# Patient Record
Sex: MALE | URBAN - METROPOLITAN AREA
[De-identification: names, ages, dates, MRNs, and addresses within clinical notes are randomized per-mention and may not be internally consistent; named-entity substitution may affect disease eponyms.]

---

## 2022-11-07 ENCOUNTER — OFFICE VISIT (OUTPATIENT)
Dept: URBAN - METROPOLITAN AREA CLINIC 83 | Facility: CLINIC | Age: 57
End: 2022-11-07
Payer: COMMERCIAL

## 2022-11-07 DIAGNOSIS — H33.311 HORSESHOE TEAR OF RETINA WITHOUT DETACHMENT, RIGHT EYE: ICD-10-CM

## 2022-11-07 DIAGNOSIS — H33.012 RETINAL DETACHMENT WITH SINGLE BREAK, LEFT EYE: Primary | ICD-10-CM

## 2022-11-07 DIAGNOSIS — H43.813 VITREOUS DEGENERATION, BILATERAL: ICD-10-CM

## 2022-11-07 PROCEDURE — 92134 CPTRZ OPH DX IMG PST SGM RTA: CPT | Performed by: OPHTHALMOLOGY

## 2022-11-07 PROCEDURE — 99205 OFFICE O/P NEW HI 60 MIN: CPT | Performed by: OPHTHALMOLOGY

## 2022-11-07 RX ORDER — OFLOXACIN 3 MG/ML
0.3 % SOLUTION/ DROPS OPHTHALMIC
Qty: 5 | Refills: 0 | Status: INACTIVE
Start: 2022-11-07 | End: 2022-12-06

## 2022-11-07 RX ORDER — PREDNISOLONE ACETATE 10 MG/ML
1 % SUSPENSION/ DROPS OPHTHALMIC
Qty: 5 | Refills: 3 | Status: INACTIVE
Start: 2022-11-07 | End: 2023-02-04

## 2022-11-07 ASSESSMENT — INTRAOCULAR PRESSURE
OS: 16
OD: 20

## 2022-11-07 NOTE — IMPRESSION/PLAN
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 OCT:   Plan: --exam confirms a macula-sparing RD OS
--findings/diagnosis d/w pt in detail --urgent surgery advised to prevent further vision loss --r/b/a of SB, PPV, and pneumatic retinopexy discussed
--risks discussed, inc bleeding, pain, infection, loss of vision --pt understands that pre-RD VA may not fully return --pt elects to proceed with RD repair SURGICAL PLAN: 25G PPV/EL/GAS OS (within 24 hours)

## 2022-11-07 NOTE — IMPRESSION/PLAN
Impression: Horseshoe tear of retina without detachment, right eye: H33.311.
s/p laser retinopexy - 2018 Plan: --history or retinal tear OD
--retinal remains fully attached, good laser barricade

## 2022-11-09 ENCOUNTER — POST-OPERATIVE VISIT (OUTPATIENT)
Dept: URBAN - METROPOLITAN AREA CLINIC 7 | Facility: CLINIC | Age: 57
End: 2022-11-09
Payer: COMMERCIAL

## 2022-11-09 PROCEDURE — 99024 POSTOP FOLLOW-UP VISIT: CPT | Performed by: OPHTHALMOLOGY

## 2022-11-09 ASSESSMENT — INTRAOCULAR PRESSURE
OS: 14
OD: 17

## 2022-11-09 NOTE — IMPRESSION/PLAN
Impression: S/P  25G PPV/EL/GAS OS  - 1 Day. Retinal detachment with single break, left eye  H33.012. Plan: PF/Oflox QID OS Gas precautions 
sleep on right side RTC 1 week POS DFE OS LH

## 2022-11-14 ENCOUNTER — POST-OPERATIVE VISIT (OUTPATIENT)
Dept: URBAN - METROPOLITAN AREA CLINIC 83 | Facility: CLINIC | Age: 57
End: 2022-11-14
Payer: COMMERCIAL

## 2022-11-14 DIAGNOSIS — H33.012 RETINAL DETACHMENT WITH SINGLE BREAK, LEFT EYE: Primary | ICD-10-CM

## 2022-11-14 PROCEDURE — 99024 POSTOP FOLLOW-UP VISIT: CPT | Performed by: OPHTHALMOLOGY

## 2022-11-14 ASSESSMENT — INTRAOCULAR PRESSURE
OS: 11
OD: 14

## 2022-11-14 NOTE — IMPRESSION/PLAN
Impression: S/P  25G PPV/EL/GAS OS  - 6 Days. Retinal detachment with single break, left eye  H33.012.  Plan: --retina attached
--no s/s infection
--IOP acceptable
--d/c Oflox, taper PF as directed
--RD/endoph WS discussed
--alt restrictions/positioning discussed
--f/u  3-4 weeks PO/OCT

## 2022-11-21 ENCOUNTER — POST-OPERATIVE VISIT (OUTPATIENT)
Dept: URBAN - METROPOLITAN AREA CLINIC 83 | Facility: CLINIC | Age: 57
End: 2022-11-21
Payer: COMMERCIAL

## 2022-11-21 DIAGNOSIS — H33.012 RETINAL DETACHMENT WITH SINGLE BREAK, LEFT EYE: Primary | ICD-10-CM

## 2022-11-21 PROCEDURE — 99024 POSTOP FOLLOW-UP VISIT: CPT | Performed by: OPHTHALMOLOGY

## 2022-11-21 RX ORDER — OFLOXACIN 3 MG/ML
0.3 % SOLUTION/ DROPS OPHTHALMIC
Qty: 5 | Refills: 0 | Status: INACTIVE
Start: 2022-11-21 | End: 2022-12-20

## 2022-11-21 RX ORDER — PREDNISOLONE ACETATE 10 MG/ML
1 % SUSPENSION/ DROPS OPHTHALMIC
Qty: 5 | Refills: 3 | Status: INACTIVE
Start: 2022-11-21 | End: 2023-02-18

## 2022-11-21 ASSESSMENT — INTRAOCULAR PRESSURE: OS: 18

## 2022-11-21 NOTE — IMPRESSION/PLAN
Impression: S/P  25G PPV/EL/GAS OS - 13 Days. Retinal detachment with single break, left eye  H33.012. Plan: --unfortunately there is a recurrent RD OS
--additional surgery advised to prevent further vision loss --r/b/a of PPV, SB, MP, EL, and GAS vs SO discussed
--risks discussed, inc bleeding, pain, infection, loss of vision --pt understands that pre-RD vision unlikely to fully return --pt elects to proceed with RD repair SURGICAL PLAN: 25G PPV/MP/EL/GAS OS

## 2022-11-23 ENCOUNTER — POST-OPERATIVE VISIT (OUTPATIENT)
Dept: URBAN - METROPOLITAN AREA CLINIC 7 | Facility: CLINIC | Age: 57
End: 2022-11-23
Payer: COMMERCIAL

## 2022-11-23 PROCEDURE — 99024 POSTOP FOLLOW-UP VISIT: CPT | Performed by: OPHTHALMOLOGY

## 2022-11-23 ASSESSMENT — INTRAOCULAR PRESSURE
OD: 9
OS: 7

## 2022-11-23 NOTE — IMPRESSION/PLAN
Impression: S/P  25G PPV/EL/GAS OS - 1 Day. Retinal detachment with single break, left eye  H33.012. Plan: Doing well, start Rx. Alt precautions. 

1 week SM

## 2022-11-28 ENCOUNTER — POST-OPERATIVE VISIT (OUTPATIENT)
Dept: URBAN - METROPOLITAN AREA CLINIC 83 | Facility: CLINIC | Age: 57
End: 2022-11-28
Payer: COMMERCIAL

## 2022-11-28 DIAGNOSIS — H33.012 RETINAL DETACHMENT WITH SINGLE BREAK, LEFT EYE: Primary | ICD-10-CM

## 2022-11-28 PROCEDURE — 99024 POSTOP FOLLOW-UP VISIT: CPT | Performed by: OPHTHALMOLOGY

## 2022-11-28 ASSESSMENT — INTRAOCULAR PRESSURE
OS: 13
OD: 12

## 2022-11-28 NOTE — IMPRESSION/PLAN
Impression: S/P S/P  25G PPV/EL/GAS OS - 6 Days. Retinal detachment with single break, left eye  H33.012.  Plan: --retina attached
--no s/s infection
--IOP acceptable
--d/c Oflox, taper PF as directed
--RD/endoph WS discussed
--alt restrictions/positioning discussed
--f/u 2 weeks for PO OS

## 2022-12-12 ENCOUNTER — POST-OPERATIVE VISIT (OUTPATIENT)
Dept: URBAN - METROPOLITAN AREA CLINIC 83 | Facility: CLINIC | Age: 57
End: 2022-12-12
Payer: COMMERCIAL

## 2022-12-12 DIAGNOSIS — H33.012 RETINAL DETACHMENT WITH SINGLE BREAK, LEFT EYE: Primary | ICD-10-CM

## 2022-12-12 PROCEDURE — 99024 POSTOP FOLLOW-UP VISIT: CPT | Performed by: OPHTHALMOLOGY

## 2022-12-12 RX ORDER — PREDNISOLONE ACETATE 10 MG/ML
1 % SUSPENSION/ DROPS OPHTHALMIC
Qty: 5 | Refills: 1 | Status: INACTIVE
Start: 2022-12-12 | End: 2023-03-11

## 2022-12-12 RX ORDER — MOXIFLOXACIN 5 MG/ML
0.5 % SOLUTION/ DROPS OPHTHALMIC
Qty: 5 | Refills: 1 | Status: INACTIVE
Start: 2022-12-12 | End: 2023-03-11

## 2022-12-12 ASSESSMENT — INTRAOCULAR PRESSURE
OS: 14
OD: 14

## 2022-12-12 NOTE — IMPRESSION/PLAN
Impression: S/P S/P  25G PPV/EL/GAS OS - 20 Days. Retinal detachment with single break, left eye  H33.012. 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
--additional surgery advised to prevent further vision loss --r/b/a of PPV, SB, MP, EL, and GAS vs SO discussed
--risks discussed, inc bleeding, pain, infection, loss of vision --pt understands that pre-RD vision unlikely to fully return --pt elects to proceed with RD repair SURGICAL PLAN: 25G PPV/MP/EL/SO OS Face down 5 days

## 2022-12-14 ENCOUNTER — SURGERY (OUTPATIENT)
Dept: URBAN - METROPOLITAN AREA EXTERNAL CLINIC 26 | Facility: EXTERNAL CLINIC | Age: 57
End: 2022-12-14
Payer: COMMERCIAL

## 2022-12-14 PROCEDURE — 67113 REPAIR RETINAL DETACH CPLX: CPT | Performed by: OPHTHALMOLOGY

## 2022-12-14 PROCEDURE — 99024 POSTOP FOLLOW-UP VISIT: CPT | Performed by: OPHTHALMOLOGY

## 2022-12-14 ASSESSMENT — INTRAOCULAR PRESSURE
OS: 14
OD: 11

## 2022-12-14 NOTE — IMPRESSION/PLAN
Impression: S/P 25G PPV/MP/EL/SO OS - 0 days. Retinal detachment with single break, left eye  H33.012. S/P  25G PPV/EL/GAS OS - 11/20/22 Status post  25G PPV/EL/GAS OS - 11/08/22 Plan: --retina attached under oil
--no s/s infection
--IOP acceptable
--start PF/Oflox QID
--RD/endoph WS discussed
--positioning discussed
--f/u 1 week PO OS

## 2022-12-19 ENCOUNTER — POST-OPERATIVE VISIT (OUTPATIENT)
Dept: URBAN - METROPOLITAN AREA CLINIC 83 | Facility: CLINIC | Age: 57
End: 2022-12-19
Payer: COMMERCIAL

## 2022-12-19 DIAGNOSIS — H33.012 RETINAL DETACHMENT WITH SINGLE BREAK, LEFT EYE: Primary | ICD-10-CM

## 2022-12-19 PROCEDURE — 99024 POSTOP FOLLOW-UP VISIT: CPT | Performed by: OPHTHALMOLOGY

## 2022-12-19 ASSESSMENT — INTRAOCULAR PRESSURE
OD: 13
OS: 14

## 2023-01-09 ENCOUNTER — POST-OPERATIVE VISIT (OUTPATIENT)
Facility: LOCATION | Age: 58
End: 2023-01-09
Payer: COMMERCIAL

## 2023-01-09 DIAGNOSIS — H33.012 RETINAL DETACHMENT WITH SINGLE BREAK, LEFT EYE: Primary | ICD-10-CM

## 2023-01-09 PROCEDURE — 92134 CPTRZ OPH DX IMG PST SGM RTA: CPT | Performed by: OPHTHALMOLOGY

## 2023-01-09 PROCEDURE — 99024 POSTOP FOLLOW-UP VISIT: CPT | Performed by: OPHTHALMOLOGY

## 2023-01-09 ASSESSMENT — INTRAOCULAR PRESSURE
OS: 14
OD: 14

## 2023-01-09 NOTE — IMPRESSION/PLAN
Impression: S/P 25G PPV/MP/EL/SO OS - 26 Days. Retinal detachment with single break, left eye  H33.012.  Plan: --retina attached under oil
--no s/s infection
--IOP acceptable
--d/c oflox, taper PF as directed
--RD/endoph WS discussed
--f/u 2 months DFE/OCT OS Pt is calling for results from her biopsy.    Please advise:  [phone]

## 2023-03-06 ENCOUNTER — OFFICE VISIT (OUTPATIENT)
Facility: LOCATION | Age: 58
End: 2023-03-06
Payer: COMMERCIAL

## 2023-03-06 DIAGNOSIS — H33.012 RETINAL DETACHMENT WITH SINGLE BREAK, LEFT EYE: Primary | ICD-10-CM

## 2023-03-06 PROCEDURE — 99024 POSTOP FOLLOW-UP VISIT: CPT | Performed by: OPHTHALMOLOGY

## 2023-03-06 PROCEDURE — 92134 CPTRZ OPH DX IMG PST SGM RTA: CPT | Performed by: OPHTHALMOLOGY

## 2023-03-06 ASSESSMENT — INTRAOCULAR PRESSURE
OS: 14
OD: 14

## 2023-03-06 NOTE — IMPRESSION/PLAN
Impression: Retinal detachment with single break, left eye  H33.012. S/P 25G PPV/MP/EL/SO OS 12/14/22
s/p 25g PPV/EL/GAS 11/22/22 Plan: --retina attached under oil
--OCT shows no ERM/CME
--surgical removal of the oil is advised at this time
--R/B/A discussed inc bleeding, pain, infection, recurrent RD
--pt agrees to proceed with surgery SURGICAL PLAN: 25G PPV/SO REMOVAL/AFX OS

## 2023-03-20 ENCOUNTER — POST-OPERATIVE VISIT (OUTPATIENT)
Facility: LOCATION | Age: 58
End: 2023-03-20
Payer: COMMERCIAL

## 2023-03-20 DIAGNOSIS — H33.012 RETINAL DETACHMENT WITH SINGLE BREAK, LEFT EYE: Primary | ICD-10-CM

## 2023-03-20 PROCEDURE — 99024 POSTOP FOLLOW-UP VISIT: CPT | Performed by: OPHTHALMOLOGY

## 2023-03-20 ASSESSMENT — INTRAOCULAR PRESSURE
OD: 15
OS: 12

## 2023-03-20 NOTE — IMPRESSION/PLAN
Impression: S/P 25G PPV/SO REMOVAL/AFX OS - 5 Days. Retinal detachment with single break, left eye  H33.012.  Plan: --retina attached
--no s/s infection
--IOP acceptable
--d/c Oflox, taper PF as directed
--RD/endoph WS discussed
--alt restrictions/positioning discussed
--f/u 2 weeks with PO/OCT OS

## 2023-04-03 ENCOUNTER — POST-OPERATIVE VISIT (OUTPATIENT)
Facility: LOCATION | Age: 58
End: 2023-04-03
Payer: COMMERCIAL

## 2023-04-03 DIAGNOSIS — H33.012 RETINAL DETACHMENT WITH SINGLE BREAK, LEFT EYE: Primary | ICD-10-CM

## 2023-04-03 PROCEDURE — 92134 CPTRZ OPH DX IMG PST SGM RTA: CPT | Performed by: OPHTHALMOLOGY

## 2023-04-03 PROCEDURE — 99024 POSTOP FOLLOW-UP VISIT: CPT | Performed by: OPHTHALMOLOGY

## 2023-04-03 ASSESSMENT — INTRAOCULAR PRESSURE
OD: 17
OS: 18

## 2023-04-03 NOTE — IMPRESSION/PLAN
Impression: S/P 25G PPV/SO REMOVAL/AFX OS - 19 Days. Retinal detachment with single break, left eye  H33.012. S/P 25G PPV/MP/EL/SO OS 12/14/22
s/p 25g PPV/EL/GAS 11/22/22 Plan: --retina attached
--no s/s infection
--IOP acceptable
--OCT shows good macular contour
--RD/endoph WS discussed
--alt restrictions/positioning discussed RTC 1 mo PO/OCT OS

## 2023-05-01 ENCOUNTER — POST-OPERATIVE VISIT (OUTPATIENT)
Facility: LOCATION | Age: 58
End: 2023-05-01
Payer: COMMERCIAL

## 2023-05-01 DIAGNOSIS — H33.012 RETINAL DETACHMENT WITH SINGLE BREAK, LEFT EYE: Primary | ICD-10-CM

## 2023-05-01 PROCEDURE — 99024 POSTOP FOLLOW-UP VISIT: CPT | Performed by: OPHTHALMOLOGY

## 2023-05-01 PROCEDURE — 92134 CPTRZ OPH DX IMG PST SGM RTA: CPT | Performed by: OPHTHALMOLOGY

## 2023-05-01 ASSESSMENT — INTRAOCULAR PRESSURE
OS: 16
OD: 13

## 2023-05-01 NOTE — IMPRESSION/PLAN
Impression: S/P 25G PPV/SO REMOVAL/AFX OS - 47 Days. Retinal detachment with single break, left eye  H33.012. 
S/P 25G PPV/MP/EL/SO OS - 12/14/22
s/p 25g PPV/EL/GAS OS - 11/22/22
s/p 25G PPV/EL/GAS OS - 11/08/2022 Plan: --retina attached
--no s/s infection
--IOP acceptable
--OCT shows good macular contour
--RD/endoph WS discussed
--refer for CE/IOL OS

RTC 3 mo DFE/OCT OU

## 2023-06-27 ENCOUNTER — ADULT PHYSICAL (OUTPATIENT)
Dept: URBAN - METROPOLITAN AREA CLINIC 85 | Facility: CLINIC | Age: 58
End: 2023-06-27
Payer: COMMERCIAL

## 2023-06-27 DIAGNOSIS — Z01.818 ENCOUNTER FOR OTHER PREPROCEDURAL EXAMINATION: Primary | ICD-10-CM

## 2023-06-27 DIAGNOSIS — H25.12 AGE-RELATED NUCLEAR CATARACT, LEFT EYE: ICD-10-CM

## 2023-06-27 PROCEDURE — 99203 OFFICE O/P NEW LOW 30 MIN: CPT | Performed by: PHYSICIAN ASSISTANT

## 2023-06-29 ENCOUNTER — OFFICE VISIT (OUTPATIENT)
Dept: URBAN - METROPOLITAN AREA CLINIC 85 | Facility: CLINIC | Age: 58
End: 2023-06-29
Payer: COMMERCIAL

## 2023-06-29 DIAGNOSIS — H33.012 RETINAL DETACHMENT WITH SINGLE BREAK, LEFT EYE: ICD-10-CM

## 2023-06-29 DIAGNOSIS — H25.13 AGE-RELATED NUCLEAR CATARACT, BILATERAL: Primary | ICD-10-CM

## 2023-06-29 PROCEDURE — 99204 OFFICE O/P NEW MOD 45 MIN: CPT | Performed by: OPHTHALMOLOGY

## 2023-06-29 RX ORDER — PREDNISOLONE ACETATE 10 MG/ML
1 % SUSPENSION/ DROPS OPHTHALMIC
Qty: 5 | Refills: 0 | Status: ACTIVE
Start: 2023-06-29

## 2023-06-29 RX ORDER — OFLOXACIN 3 MG/ML
0.3 % SOLUTION/ DROPS OPHTHALMIC
Qty: 5 | Refills: 0 | Status: ACTIVE
Start: 2023-06-29

## 2023-06-29 ASSESSMENT — KERATOMETRY
OD: 42.75
OS: 42.75

## 2023-06-29 ASSESSMENT — INTRAOCULAR PRESSURE
OS: 18
OD: 14

## 2023-06-29 ASSESSMENT — VISUAL ACUITY: OD: 20/20

## 2023-06-29 NOTE — IMPRESSION/PLAN
Impression: S/P 25G PPV/SO REMOVAL/AFX OS - 03/14/2023 (SM) Plan: Discussed Retina is attached.   Continue to monitor in that regard with retinal specialist.

## 2023-06-29 NOTE — IMPRESSION/PLAN
Impression: Age-related nuclear cataract, bilateral: H25.13. Plan: Cataracts account for the patient's complaints. Discussed all risks, benefits, procedures and recovery. Patient has quality of life issues. Patient understands changing glasses will not improve vision. Discussed myopia findings with patient and difficulty for the brain to adapt to difference. Advised leaving the OS mildly nearsighted. Patient desires to have surgery, recommend phacoemulsification with intraocular lens. CE w/IOL OS. R/B/A discussed. RL2. Lens: Mak acrylic IOL will be used secondary to previous oil in the eye   Aim: -2.00. Macho Majestic or Trimoxi will be used.

## 2023-07-05 ENCOUNTER — SURGERY (OUTPATIENT)
Dept: URBAN - METROPOLITAN AREA SURGERY 55 | Facility: SURGERY | Age: 58
End: 2023-07-05
Payer: COMMERCIAL

## 2023-07-05 DIAGNOSIS — H25.13 AGE-RELATED NUCLEAR CATARACT, BILATERAL: Primary | ICD-10-CM

## 2023-07-05 PROCEDURE — PR1CP PR1CP: CUSTOM | Performed by: OPHTHALMOLOGY

## 2023-07-05 PROCEDURE — 66984 XCAPSL CTRC RMVL W/O ECP: CPT | Performed by: OPHTHALMOLOGY

## 2023-07-06 ENCOUNTER — POST-OPERATIVE VISIT (OUTPATIENT)
Dept: URBAN - METROPOLITAN AREA CLINIC 85 | Facility: CLINIC | Age: 58
End: 2023-07-06
Payer: COMMERCIAL

## 2023-07-06 DIAGNOSIS — H25.11 AGE-RELATED NUCLEAR CATARACT, RIGHT EYE: ICD-10-CM

## 2023-07-06 DIAGNOSIS — Z48.810 ENCOUNTER FOR SURGICAL AFTERCARE FOLLOWING SURGERY ON A SENSE ORGAN: Primary | ICD-10-CM

## 2023-07-06 PROCEDURE — 99024 POSTOP FOLLOW-UP VISIT: CPT | Performed by: OPTOMETRIST

## 2023-07-06 ASSESSMENT — INTRAOCULAR PRESSURE: OS: 15

## 2023-07-18 ENCOUNTER — POST-OPERATIVE VISIT (OUTPATIENT)
Dept: URBAN - METROPOLITAN AREA CLINIC 85 | Facility: CLINIC | Age: 58
End: 2023-07-18
Payer: COMMERCIAL

## 2023-07-18 DIAGNOSIS — Z48.810 ENCOUNTER FOR SURGICAL AFTERCARE FOLLOWING SURGERY ON A SENSE ORGAN: Primary | ICD-10-CM

## 2023-07-18 PROCEDURE — 99024 POSTOP FOLLOW-UP VISIT: CPT | Performed by: OPTOMETRIST

## 2023-07-18 ASSESSMENT — INTRAOCULAR PRESSURE
OD: 13
OS: 14

## 2023-07-18 NOTE — IMPRESSION/PLAN
Impression: S/P Cataract Extraction by phacoemulsification with IOL placement; ORA OS - 13 Days. Encounter for surgical aftercare following surgery on a sense organ  Z48.810. Patient would like to cancel second eye surgery for now. (would like to let the left eye heal longer). Plan: Follow post op instructions per surgeon and RTC as scheduled or ASAP if pain, decreased vision, or any worsening of condition.

## 2023-08-09 ENCOUNTER — POST-OPERATIVE VISIT (OUTPATIENT)
Dept: URBAN - METROPOLITAN AREA CLINIC 85 | Facility: CLINIC | Age: 58
End: 2023-08-09
Payer: COMMERCIAL

## 2023-08-09 DIAGNOSIS — Z48.810 ENCOUNTER FOR SURGICAL AFTERCARE FOLLOWING SURGERY ON A SENSE ORGAN: Primary | ICD-10-CM

## 2023-08-09 PROCEDURE — 99024 POSTOP FOLLOW-UP VISIT: CPT | Performed by: OPTOMETRIST

## 2023-08-09 ASSESSMENT — VISUAL ACUITY
OD: 20/20
OS: 20/100

## 2023-08-09 ASSESSMENT — INTRAOCULAR PRESSURE
OS: 20
OD: 13

## 2023-08-16 ENCOUNTER — OFFICE VISIT (OUTPATIENT)
Dept: URBAN - METROPOLITAN AREA CLINIC 85 | Facility: CLINIC | Age: 58
End: 2023-08-16
Payer: COMMERCIAL

## 2023-08-16 DIAGNOSIS — H26.492 OTHER SECONDARY CATARACT, LEFT EYE: Primary | ICD-10-CM

## 2023-08-16 PROCEDURE — 99024 POSTOP FOLLOW-UP VISIT: CPT | Performed by: OPHTHALMOLOGY

## 2023-08-16 ASSESSMENT — KERATOMETRY: OD: 39.25

## 2023-08-16 ASSESSMENT — VISUAL ACUITY: OD: 20/20

## 2023-08-16 ASSESSMENT — INTRAOCULAR PRESSURE: OS: 20

## 2023-08-22 ENCOUNTER — SURGERY (OUTPATIENT)
Dept: URBAN - METROPOLITAN AREA SURGERY 55 | Facility: SURGERY | Age: 58
End: 2023-08-22
Payer: COMMERCIAL

## 2023-08-22 PROCEDURE — 66821 AFTER CATARACT LASER SURGERY: CPT | Performed by: OPHTHALMOLOGY

## 2023-08-23 ENCOUNTER — OFFICE VISIT (OUTPATIENT)
Dept: URBAN - METROPOLITAN AREA CLINIC 85 | Facility: CLINIC | Age: 58
End: 2023-08-23
Payer: COMMERCIAL

## 2023-08-23 DIAGNOSIS — H20.9 UVEITIS: Primary | ICD-10-CM

## 2023-08-23 PROCEDURE — 99024 POSTOP FOLLOW-UP VISIT: CPT | Performed by: OPTOMETRIST

## 2023-08-23 RX ORDER — PREDNISOLONE ACETATE 10 MG/ML
1 % SUSPENSION/ DROPS OPHTHALMIC
Qty: 1 | Refills: 0 | Status: ACTIVE
Start: 2023-08-23

## 2023-08-23 ASSESSMENT — INTRAOCULAR PRESSURE
OS: 19
OD: 14

## 2023-09-06 ENCOUNTER — OFFICE VISIT (OUTPATIENT)
Dept: URBAN - METROPOLITAN AREA CLINIC 85 | Facility: CLINIC | Age: 58
End: 2023-09-06
Payer: COMMERCIAL

## 2023-09-06 DIAGNOSIS — H20.9 UVEITIS: Primary | ICD-10-CM

## 2023-09-06 PROCEDURE — 99024 POSTOP FOLLOW-UP VISIT: CPT | Performed by: OPTOMETRIST

## 2023-09-06 ASSESSMENT — INTRAOCULAR PRESSURE
OD: 16
OS: 21

## 2024-06-21 ENCOUNTER — OFFICE VISIT (OUTPATIENT)
Dept: URBAN - METROPOLITAN AREA CLINIC 87 | Facility: CLINIC | Age: 59
End: 2024-06-21
Payer: COMMERCIAL

## 2024-06-21 DIAGNOSIS — H35.372 PUCKERING OF MACULA, LEFT EYE: ICD-10-CM

## 2024-06-21 DIAGNOSIS — H59.812 CHORIORETINAL SCARS AFTER SURGERY FOR DETACHMENT, LEFT EYE: Primary | ICD-10-CM

## 2024-06-21 DIAGNOSIS — Z96.1 PRESENCE OF INTRAOCULAR LENS: ICD-10-CM

## 2024-06-21 PROCEDURE — 99213 OFFICE O/P EST LOW 20 MIN: CPT | Performed by: OPHTHALMOLOGY

## 2024-06-21 PROCEDURE — 92134 CPTRZ OPH DX IMG PST SGM RTA: CPT | Performed by: OPHTHALMOLOGY

## 2024-06-21 ASSESSMENT — INTRAOCULAR PRESSURE
OD: 14
OS: 19